# Patient Record
Sex: MALE | Race: WHITE | NOT HISPANIC OR LATINO
[De-identification: names, ages, dates, MRNs, and addresses within clinical notes are randomized per-mention and may not be internally consistent; named-entity substitution may affect disease eponyms.]

---

## 2018-07-26 ENCOUNTER — APPOINTMENT (OUTPATIENT)
Dept: NEUROSURGERY | Facility: CLINIC | Age: 78
End: 2018-07-26
Payer: MEDICARE

## 2018-07-26 VITALS
HEART RATE: 62 BPM | SYSTOLIC BLOOD PRESSURE: 142 MMHG | DIASTOLIC BLOOD PRESSURE: 82 MMHG | HEIGHT: 68 IN | BODY MASS INDEX: 29.25 KG/M2 | WEIGHT: 193 LBS

## 2018-07-26 PROBLEM — Z00.00 ENCOUNTER FOR PREVENTIVE HEALTH EXAMINATION: Status: ACTIVE | Noted: 2018-07-26

## 2018-07-26 PROCEDURE — 99204 OFFICE O/P NEW MOD 45 MIN: CPT

## 2018-07-26 RX ORDER — HYDROCHLOROTHIAZIDE 12.5 MG/1
12.5 CAPSULE ORAL
Refills: 0 | Status: ACTIVE | COMMUNITY

## 2018-07-26 RX ORDER — ATORVASTATIN CALCIUM 20 MG/1
20 TABLET, FILM COATED ORAL
Refills: 0 | Status: ACTIVE | COMMUNITY

## 2018-07-26 RX ORDER — GABAPENTIN 300 MG/1
300 CAPSULE ORAL
Refills: 0 | Status: ACTIVE | COMMUNITY

## 2018-07-26 RX ORDER — IPRATROPIUM BROMIDE AND ALBUTEROL SULFATE 2.5; .5 MG/3ML; MG/3ML
0.5-2.5 (3) SOLUTION RESPIRATORY (INHALATION)
Refills: 0 | Status: ACTIVE | COMMUNITY

## 2018-07-26 RX ORDER — VERAPAMIL HYDROCHLORIDE 120 MG/1
120 TABLET ORAL
Refills: 0 | Status: ACTIVE | COMMUNITY

## 2018-09-05 ENCOUNTER — OUTPATIENT (OUTPATIENT)
Dept: OUTPATIENT SERVICES | Facility: HOSPITAL | Age: 78
LOS: 1 days | End: 2018-09-05

## 2018-09-13 ENCOUNTER — OUTPATIENT (OUTPATIENT)
Dept: OUTPATIENT SERVICES | Facility: HOSPITAL | Age: 78
LOS: 1 days | End: 2018-09-13

## 2018-09-13 ENCOUNTER — APPOINTMENT (OUTPATIENT)
Dept: NEUROSURGERY | Facility: HOSPITAL | Age: 78
End: 2018-09-13
Payer: MEDICARE

## 2018-09-13 PROCEDURE — 22845 INSERT SPINE FIXATION DEVICE: CPT | Mod: 59

## 2018-09-13 PROCEDURE — 63081 REMOVE VERT BODY DCMPRN CRVL: CPT

## 2018-09-13 PROCEDURE — 22554 ARTHRD ANT NTRBD MIN DSC CRV: CPT

## 2018-09-13 PROCEDURE — 22853 INSJ BIOMECHANICAL DEVICE: CPT | Mod: 59

## 2018-09-13 PROCEDURE — 22585 ARTHRD ANT NTRBD MIN DSC EA: CPT

## 2018-09-17 ENCOUNTER — APPOINTMENT (OUTPATIENT)
Dept: NEUROSURGERY | Facility: CLINIC | Age: 78
End: 2018-09-17
Payer: MEDICARE

## 2018-09-17 VITALS
BODY MASS INDEX: 29.25 KG/M2 | WEIGHT: 193 LBS | TEMPERATURE: 98.5 F | SYSTOLIC BLOOD PRESSURE: 161 MMHG | HEIGHT: 68 IN | HEART RATE: 86 BPM | DIASTOLIC BLOOD PRESSURE: 84 MMHG

## 2018-09-17 DIAGNOSIS — Z82.49 FAMILY HISTORY OF ISCHEMIC HEART DISEASE AND OTHER DISEASES OF THE CIRCULATORY SYSTEM: ICD-10-CM

## 2018-09-17 DIAGNOSIS — Z87.09 PERSONAL HISTORY OF OTHER DISEASES OF THE RESPIRATORY SYSTEM: ICD-10-CM

## 2018-09-17 DIAGNOSIS — Z86.69 PERSONAL HISTORY OF OTHER DISEASES OF THE NERVOUS SYSTEM AND SENSE ORGANS: ICD-10-CM

## 2018-09-17 DIAGNOSIS — Z86.39 PERSONAL HISTORY OF OTHER ENDOCRINE, NUTRITIONAL AND METABOLIC DISEASE: ICD-10-CM

## 2018-09-17 DIAGNOSIS — Z87.891 PERSONAL HISTORY OF NICOTINE DEPENDENCE: ICD-10-CM

## 2018-09-17 PROCEDURE — 99024 POSTOP FOLLOW-UP VISIT: CPT

## 2018-09-17 RX ORDER — METHYLPREDNISOLONE 4 MG/1
4 TABLET ORAL
Qty: 1 | Refills: 0 | Status: ACTIVE | COMMUNITY
Start: 2018-09-17 | End: 1900-01-01

## 2018-09-17 RX ORDER — TIZANIDINE HYDROCHLORIDE 4 MG/1
4 CAPSULE ORAL EVERY 8 HOURS
Qty: 21 | Refills: 0 | Status: ACTIVE | COMMUNITY
Start: 2018-09-17 | End: 1900-01-01

## 2018-09-25 ENCOUNTER — APPOINTMENT (OUTPATIENT)
Dept: NEUROSURGERY | Facility: CLINIC | Age: 78
End: 2018-09-25
Payer: MEDICARE

## 2018-09-25 VITALS
WEIGHT: 193 LBS | SYSTOLIC BLOOD PRESSURE: 170 MMHG | TEMPERATURE: 98.5 F | HEART RATE: 69 BPM | BODY MASS INDEX: 29.25 KG/M2 | HEIGHT: 68 IN | DIASTOLIC BLOOD PRESSURE: 84 MMHG

## 2018-09-25 PROCEDURE — 99024 POSTOP FOLLOW-UP VISIT: CPT

## 2018-11-06 ENCOUNTER — APPOINTMENT (OUTPATIENT)
Dept: NEUROSURGERY | Facility: CLINIC | Age: 78
End: 2018-11-06
Payer: MEDICARE

## 2018-11-06 VITALS
BODY MASS INDEX: 29.55 KG/M2 | DIASTOLIC BLOOD PRESSURE: 74 MMHG | WEIGHT: 195 LBS | SYSTOLIC BLOOD PRESSURE: 150 MMHG | HEART RATE: 70 BPM | HEIGHT: 68 IN

## 2018-11-06 PROCEDURE — 99024 POSTOP FOLLOW-UP VISIT: CPT

## 2019-03-13 ENCOUNTER — APPOINTMENT (OUTPATIENT)
Dept: NEUROSURGERY | Facility: CLINIC | Age: 79
End: 2019-03-13
Payer: MEDICARE

## 2019-03-13 VITALS
BODY MASS INDEX: 29.55 KG/M2 | DIASTOLIC BLOOD PRESSURE: 89 MMHG | HEART RATE: 60 BPM | SYSTOLIC BLOOD PRESSURE: 171 MMHG | WEIGHT: 195 LBS | HEIGHT: 68 IN

## 2019-03-13 PROCEDURE — 99214 OFFICE O/P EST MOD 30 MIN: CPT

## 2019-03-13 NOTE — REASON FOR VISIT
[Follow-Up: _____] : a [unfilled] follow-up visit [Spouse] : spouse [FreeTextEntry1] : Patient is here for a follow up - He had surgery in september 13,2018. he says he is doing very well- he says he has no pain. A couple weeks after the operation he was 100% better.

## 2019-03-13 NOTE — HISTORY OF PRESENT ILLNESS
[FreeTextEntry1] : Mr. Kauffman presents to the office 6 months post operative from an anterior cervical decompression and fusion C4-6. He is doing extremely well and offers resolution of his symptoms in his upper extremities. He offers no complaints at this time and is no longer taking anything for his pain. He is still using weight to work on strengthening his upper extremities.

## 2019-03-13 NOTE — RESULTS/DATA
[FreeTextEntry1] : Cervical xrays performed in the office today reveal interbody spacers at C4-5 and C5-6 with evidence of fusion. There is an anterior plate atop. Screws are in good positioning and there is no evidence of malalignment.

## 2019-03-13 NOTE — PHYSICAL EXAM
[General Appearance - Alert] : alert [General Appearance - In No Acute Distress] : in no acute distress [General Appearance - Well Nourished] : well nourished [General Appearance - Well Developed] : well developed [General Appearance - Well-Appearing] : healthy appearing [] : normal voice and communication [Transverse] : transverse [Well-Healed] : well-healed [Person] : oriented to person [Place] : oriented to place [Time] : oriented to time [Involuntary Movements] : no involuntary movements were seen [Motor Handedness Right-Handed] : the patient is right hand dominant [4] : C7 extensor digitorum longus 4/5 [3] : C8 finger flexors 3/5 [5] : C8 finger flexors 5/5 [FreeTextEntry1] : anterior neck [FreeTextEntry6] : +right thenar muscle and 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

## 2019-03-13 NOTE — ASSESSMENT
[FreeTextEntry1] : Mr. Kauffman presents to the office today six months status post C4-6 anterior cervical fusion. He has healed up well and at this point may resume his normal activities of daily living. He should continue to use bands or a squeeze ball to work on his  strength. He may take antiinflammatories as needed for pain. We will have him back in the office at a year post-op for one more set of cervical xrays. He may follow-up with us in the office sooner if needed.

## 2019-08-19 ENCOUNTER — APPOINTMENT (OUTPATIENT)
Dept: NEUROSURGERY | Facility: CLINIC | Age: 79
End: 2019-08-19
Payer: MEDICARE

## 2019-08-19 VITALS
BODY MASS INDEX: 30.31 KG/M2 | SYSTOLIC BLOOD PRESSURE: 137 MMHG | DIASTOLIC BLOOD PRESSURE: 85 MMHG | HEART RATE: 67 BPM | WEIGHT: 200 LBS | HEIGHT: 68 IN

## 2019-08-19 DIAGNOSIS — S14.3XXA INJURY OF BRACHIAL PLEXUS, INITIAL ENCOUNTER: ICD-10-CM

## 2019-08-19 DIAGNOSIS — G95.20 UNSPECIFIED CORD COMPRESSION: ICD-10-CM

## 2019-08-19 PROCEDURE — 99213 OFFICE O/P EST LOW 20 MIN: CPT

## 2019-08-19 NOTE — DATA REVIEWED
[de-identified] : Obtained in the office today of the cervical spine (8/19/2019) - intact hardware with good anatomic alignment status post ACDF C4-6

## 2019-08-19 NOTE — REASON FOR VISIT
[Follow-Up: _____] : a [unfilled] follow-up visit [Spouse] : spouse [FreeTextEntry1] : patient had  surgery in september 13,2018.

## 2019-08-19 NOTE — PHYSICAL EXAM
[General Appearance - In No Acute Distress] : in no acute distress [General Appearance - Alert] : alert [General Appearance - Well Nourished] : well nourished [General Appearance - Well Developed] : well developed [General Appearance - Well-Appearing] : healthy appearing [] : normal voice and communication [Transverse] : transverse [Clean] : clean [Dry] : dry [Well-Healed] : well-healed [Intact] : intact [No Drainage] : without drainage [Normal Skin] : normal [Erythema] : not erythematous [Tender] : not tender [Warm] : not warm [Indurated] : not indurated [Fluctuant] : not fluctuant [Normal Skin Turgor] : skin turgor was normal [Impaired Insight] : insight and judgment were intact [Oriented To Time, Place, And Person] : oriented to person, place, and time [Affect] : the affect was normal [Person] : oriented to person [Place] : oriented to place [Time] : oriented to time [Motor Handedness Right-Handed] : the patient is right hand dominant [5] : T1 abductor digiti minimi 5/5 [Sensation Tactile Decrease] : light touch was intact [FreeTextEntry6] : Muscle atrophy noted in the thenar muscles of the hand. Unable to make a full fist. Decreased  strength when compared to the left  [Normal Lordosis] : normal lordosis [No Tenderness to Palpation] : no spine tenderness on palpation [Full ROM] : full ROM [No Pain with ROM] : no pain with motion in any direction

## 2019-08-19 NOTE — ASSESSMENT
[FreeTextEntry1] : Discussed the history, physical examination findings, surgery, and recent imaging with the patient with all questions answered. The patient is doing well at his one year postoperative visit. He is encouraged to continue hand  range of motion exercises at home. No further neurosurgical intervention at this time. The patient follow up on an as-needed basis.

## 2020-08-24 ENCOUNTER — APPOINTMENT (OUTPATIENT)
Dept: NEUROSURGERY | Facility: CLINIC | Age: 80
End: 2020-08-24
Payer: MEDICARE

## 2020-08-24 VITALS
HEIGHT: 68 IN | BODY MASS INDEX: 30.31 KG/M2 | HEART RATE: 65 BPM | WEIGHT: 200 LBS | TEMPERATURE: 97.3 F | SYSTOLIC BLOOD PRESSURE: 166 MMHG | DIASTOLIC BLOOD PRESSURE: 84 MMHG

## 2020-08-24 VITALS
WEIGHT: 200 LBS | TEMPERATURE: 97.3 F | DIASTOLIC BLOOD PRESSURE: 84 MMHG | HEIGHT: 68 IN | SYSTOLIC BLOOD PRESSURE: 166 MMHG | BODY MASS INDEX: 30.31 KG/M2 | HEART RATE: 65 BPM

## 2020-08-24 DIAGNOSIS — R26.81 UNSTEADINESS ON FEET: ICD-10-CM

## 2020-08-24 PROCEDURE — 99215 OFFICE O/P EST HI 40 MIN: CPT

## 2020-08-24 RX ORDER — CELECOXIB 200 MG/1
200 CAPSULE ORAL
Qty: 30 | Refills: 2 | Status: ACTIVE | COMMUNITY
Start: 2020-08-24 | End: 1900-01-01

## 2020-08-24 NOTE — ASSESSMENT
[FreeTextEntry1] : DIAGNOSIS:  LUMBAR STENOSIS with NEUROGENIC CLAUDICATION, SPONDYLOSIS WITH SEVERE STENOSIS L3-L4, L4-5, \par TREATMENT PLAN:  LUMBAR DECOMPRESSION with possible STABILIZATION AND FUSION L3-5 vs. CONSERVATIVE METHODS\par \par This is a patient with severe bilateral facet arthropathy and bulging discs at L3-4 and L4-5 resulting in severe central canal stenosis causing symptoms suggestive of neurogenic claudication. He has not yet undergone any conservative management, but he has significant weakness on exam. I think at this point he would benefit from a decompressive laminectomy and would good candidate for it. This entails removing the lamina and possibly removing the medial facet joints along with the underlying hypertrophied ligamentum flavum.  This will allow for a widening of the spinal canal and the neuroforamen at the effected levels.  In doing so may create instability to the spine (at this level) requiring the need for instrumented stabilization and fusion.  This implies the use of pedicle screws and possibly interbody prosthetics to provide strength and anatomical alignment to the operated segments.  \par  \par Risks and benefits of surgery were described to the patient in detail which include but not excluding those otherwise not mentioned:  coma paralysis, death, stroke, spinal fluid leak, nerve injury, weakness, infection, hardware malfunction/failure/malpositioning requiring re-operation, vascular injury, nonunion/pseudoarthrosis, adjacent segment degeneration, dysphonia/dysphagia and persistent pain.\par

## 2020-08-24 NOTE — REASON FOR VISIT
[Spouse] : spouse [Post Urgent Care] : a post urgent care visit [FreeTextEntry1] : Pt. presents in the office for a follow up. Pt. states 7 weeks ago he fell while trying to take out his recyclables. Pt. states he then followed up the same day to [facility] where they sent him out for an MRI and X-Rays. Pt. c/o severe lower back pain, a feeling of heaviness in both legs and feet, and trouble going to the bathroom. Pt. states he feels a "ten's machine-like sensation in both of his legs and feet". Pt. also states that sometimes his legs just give out on him causing him to fall frequently.

## 2020-08-24 NOTE — RESULTS/DATA
[FreeTextEntry1] : MRI ([facility] 8/21/20) was personally reviewed with the patient in the office today. At L3-4 with a broad based disc bulge paracentral to the left and severe bilateral facet arthropathy resulting in moderate to severe central canal stenosis and bilateral foraminal stenosis, left greater than right. There is impingement of the left L3 nerve root. At L4-5 there is a broad based disc bulge and bilateral facet arthropathy resulting in moderate central and severe foraminal stenosis, and impingement of the L4 nerve roots bilaterally. \par \par Cervical xrays reveal well healed fusion at C4-C6. There is disc space noted above the level of the fusion at C3-4.

## 2020-08-24 NOTE — HISTORY OF PRESENT ILLNESS
[FreeTextEntry1] : Mr. Kauffman presents to the office today with worsening pain and difficulty ambulating for the past seven weeks. He reports sustaining a fall while taking on his recyclables and since then has been experiencing back pain as well as lower extremity heaviness, and numbness. He had followed up with his primary who recommended PT which he did for a few sessions without much success. He regressed from a cane to a walker. He has only been taking Tylenol for pain with minimal relief. He reports spasms in his legs at night and difficulty standing/walking for prolonged period of time.

## 2020-08-24 NOTE — PHYSICAL EXAM
[General Appearance - Alert] : alert [General Appearance - In No Acute Distress] : in no acute distress [General Appearance - Well Developed] : well developed [General Appearance - Well Nourished] : well nourished [Place] : oriented to place [Person] : oriented to person [Motor Handedness Right-Handed] : the patient is right hand dominant [Time] : oriented to time [5] : S1 flexor hallucis longus 5/5 [Sensation Vibration Decrease] : vibration was intact [Proprioception] : proprioception was intact [Limited Balance] : the patient's balance was impaired [Antalgic] : antalgic [4] : 4/5 Ankle Dorsiflexor (L4) [Pain / Temp Decrease Knee And Medial Leg (L4) - Left Only] : L4 sensory impairment [Pain / Temp Decrease Lateral Leg & Dorsum Of Foot Left Only] : L5 sensory impairment [3] : 3/5 Ankle Dorsiflexor (L4) [FreeTextEntry1] : ambulating with walker [FreeTextEntry6] : +pill rolling tremor\par +right thenar m atrophy\par +left quad atrophy [FreeTextEntry8] : +shuffling gait

## 2020-08-24 NOTE — REVIEW OF SYSTEMS
[Tingling] : tingling [As Noted in HPI] : as noted in HPI [Numbness] : numbness [Negative] : Constitutional

## 2020-08-24 NOTE — CONSULT LETTER
[Dear  ___] : Dear  [unfilled], [Consult Letter:] : I had the pleasure of evaluating your patient, [unfilled]. [Consult Closing:] : Thank you very much for allowing me to participate in the care of this patient.  If you have any questions, please do not hesitate to contact me. [Sincerely,] : Sincerely, [FreeTextEntry1] : Mr. Kauffman presents to the office today with worsening pain and difficulty ambulating for the past seven weeks. He reports sustaining a fall while taking on his recyclables and since then has been experiencing back pain as well as lower extremity heaviness, and numbness. He had followed up with his primary who recommended PT which he did for a few sessions without much success. He regressed from a cane to a walker. He has only been taking Tylenol for pain with minimal relief. He reports spasms in his legs at night and difficulty standing/walking for prolonged period of time. \par \par DIAGNOSIS:  LUMBAR STENOSIS with NEUROGENIC CLAUDICATION, SPONDYLOSIS WITH SEVERE STENOSIS L3-L4, L4-5, \par TREATMENT PLAN:  LUMBAR DECOMPRESSION with possible STABILIZATION AND FUSION L3-5 vs. CONSERVATIVE METHODS\par \par This is a patient with severe bilateral facet arthropathy and bulging discs at L3-4 and L4-5 resulting in severe central canal stenosis causing symptoms suggestive of neurogenic claudication. He has not yet undergone any conservative management, but he has significant weakness on exam. I think at this point he would benefit from a decompressive laminectomy and would good candidate for it. This entails removing the lamina and possibly removing the medial facet joints along with the underlying hypertrophied ligamentum flavum.  This will allow for a widening of the spinal canal and the neuroforamen at the effected levels.  In doing so may create instability to the spine (at this level) requiring the need for instrumented stabilization and fusion.  This implies the use of pedicle screws and possibly interbody prosthetics to provide strength and anatomical alignment to the operated segments.  \par  \par  [FreeTextEntry3] : Oma Tripathi [id number]

## 2020-09-02 ENCOUNTER — APPOINTMENT (OUTPATIENT)
Dept: NEUROSURGERY | Facility: CLINIC | Age: 80
End: 2020-09-02
Payer: MEDICARE

## 2020-09-02 VITALS
HEIGHT: 68 IN | BODY MASS INDEX: 30.31 KG/M2 | SYSTOLIC BLOOD PRESSURE: 175 MMHG | DIASTOLIC BLOOD PRESSURE: 90 MMHG | WEIGHT: 200 LBS | HEART RATE: 72 BPM

## 2020-09-02 DIAGNOSIS — M48.062 SPINAL STENOSIS, LUMBAR REGION WITH NEUROGENIC CLAUDICATION: ICD-10-CM

## 2020-09-02 DIAGNOSIS — Z98.1 ARTHRODESIS STATUS: ICD-10-CM

## 2020-09-02 PROCEDURE — 99214 OFFICE O/P EST MOD 30 MIN: CPT

## 2020-09-02 RX ORDER — TRAMADOL HYDROCHLORIDE 50 MG/1
50 TABLET, COATED ORAL
Qty: 40 | Refills: 0 | Status: ACTIVE | COMMUNITY
Start: 2020-09-02 | End: 1900-01-01

## 2020-09-02 NOTE — PHYSICAL EXAM
[Antalgic] : antalgic [FreeTextEntry6] : Exam is limited secondary to pain. He can lift his legs against gravity he can ambulate with the use of a walker. Grading would be 4 minus/5 proximally and distally of the lower extremities [Able to toe walk] : the patient was not able to toe walk [Able to heel walk] : the patient was not able to heel walk

## 2020-09-02 NOTE — RESULTS/DATA
[FreeTextEntry1] : \par Shawn MRI of the cervical and lumbar spine were reviewed. There is a adequate decompression C46. There is some mild adjacent segment degeneration at C3-4. Lumbar spine he has multilevel spondylosis with rather severe disc degeneration at L3-4 and L4-5 with old and discs and stenosis at these levels.

## 2020-09-02 NOTE — ASSESSMENT
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 LUMBAR DECOMPRESSION WITH INSTRUMENTED STABILIZATION AND FUSION   L3-5 \par \par This is a patient with degenerated lumbar disks with associated stenosis and spondylosis.  I have recommended nonsurgical management at this time.  This consists of physical therapy and/or manual medicine in conjunction to medical therapy and other conservative methods.  These include the consideration of trigger point injections and or the utilization of modalities such as TENS where applicable.  The next tier would be the referral to a pain management specialist (anesthesia or physiatry) for the consideration of spinal injections.  This includes the options of epidural steroids, facet injections as well as other novel techniques that may provide pain relief.  \par \par If all nonsurgical methods fail or there is neurological issue of concern, I have recommended lumbar decompression as a treatment option.  This entails removing the lamina and the medial facet joints along with the underlying hypertrophied ligamentum flavum.  This will allow for a widening of the spinal canal and the neuroforamen at the effected levels.  In doing so will likely result in added instability to the spine at this level requiring the need for instrumented stabilization and fusion.  This implies the use of pedicle screws and possibly interbody prosthetics to provide strength and anatomical alignment to the operated segments.  \par \par Risks and benefits of surgery were described to the patient in detail which include but not excluding those otherwise not mentioned:  coma paralysis, death, stroke, spinal fluid leak, nerve injury, weakness, infection, hardware malfunction/failure/mal-positioning requiring re-operation, vascular injury, nonunion/pseudoarthrosis, adjacent segment degeneration, dysphonia/dysphagia and persistent pain.\par \par I have reviewed the images in detail with the patient today in my office and have answered all questions regarding this condition to the best of my ability to the patient’s satisfaction.

## 2020-09-02 NOTE — REASON FOR VISIT
[Follow-Up: _____] : a [unfilled] follow-up visit [Spouse] : spouse [FreeTextEntry1] : \sandra Leiva is here with his wife for a followup evaluation regarding his lumbar spine. He has a history of a cervical fusion from C4-6. He has no lumbar spinal surgeries however took a really bad fall recently has had excruciating back pain leg pain difficulty walking. He is now using a walker to ambulate. There is no bowel or bladder incontinence. He is on some anti-inflammatories and simple pain medications however is here for my evaluation with recent imaging done at [facility].\par